# Patient Record
Sex: MALE | Race: WHITE | HISPANIC OR LATINO
[De-identification: names, ages, dates, MRNs, and addresses within clinical notes are randomized per-mention and may not be internally consistent; named-entity substitution may affect disease eponyms.]

---

## 2017-01-03 ENCOUNTER — APPOINTMENT (OUTPATIENT)
Dept: ORTHOPEDIC SURGERY | Facility: CLINIC | Age: 52
End: 2017-01-03

## 2017-01-03 DIAGNOSIS — M65.332 TRIGGER FINGER, LEFT MIDDLE FINGER: ICD-10-CM

## 2017-10-02 ENCOUNTER — APPOINTMENT (OUTPATIENT)
Dept: ORTHOPEDIC SURGERY | Facility: CLINIC | Age: 52
End: 2017-10-02
Payer: MEDICAID

## 2017-10-02 DIAGNOSIS — M72.2 PLANTAR FASCIAL FIBROMATOSIS: ICD-10-CM

## 2017-10-02 PROCEDURE — 99214 OFFICE O/P EST MOD 30 MIN: CPT | Mod: 25

## 2017-10-02 PROCEDURE — 20551 NJX 1 TENDON ORIGIN/INSJ: CPT | Mod: RT

## 2017-10-09 ENCOUNTER — APPOINTMENT (OUTPATIENT)
Dept: ORTHOPEDIC SURGERY | Facility: CLINIC | Age: 52
End: 2017-10-09

## 2017-12-18 ENCOUNTER — APPOINTMENT (OUTPATIENT)
Dept: ORTHOPEDIC SURGERY | Facility: CLINIC | Age: 52
End: 2017-12-18
Payer: MEDICAID

## 2017-12-18 DIAGNOSIS — M17.12 UNILATERAL PRIMARY OSTEOARTHRITIS, LEFT KNEE: ICD-10-CM

## 2017-12-18 PROCEDURE — 99214 OFFICE O/P EST MOD 30 MIN: CPT | Mod: 25

## 2017-12-18 PROCEDURE — 20610 DRAIN/INJ JOINT/BURSA W/O US: CPT | Mod: LT

## 2017-12-18 PROCEDURE — 73562 X-RAY EXAM OF KNEE 3: CPT | Mod: LT

## 2017-12-18 PROCEDURE — 73610 X-RAY EXAM OF ANKLE: CPT | Mod: LT

## 2018-02-26 ENCOUNTER — APPOINTMENT (OUTPATIENT)
Dept: ORTHOPEDIC SURGERY | Facility: CLINIC | Age: 53
End: 2018-02-26
Payer: MEDICAID

## 2018-02-26 DIAGNOSIS — M77.11 LATERAL EPICONDYLITIS, RIGHT ELBOW: ICD-10-CM

## 2018-02-26 PROCEDURE — 99213 OFFICE O/P EST LOW 20 MIN: CPT | Mod: 25

## 2018-02-26 PROCEDURE — 20551 NJX 1 TENDON ORIGIN/INSJ: CPT | Mod: RT

## 2019-09-04 ENCOUNTER — APPOINTMENT (OUTPATIENT)
Dept: ORTHOPEDIC SURGERY | Facility: CLINIC | Age: 54
End: 2019-09-04
Payer: MEDICAID

## 2019-09-04 VITALS
SYSTOLIC BLOOD PRESSURE: 165 MMHG | BODY MASS INDEX: 43.4 KG/M2 | HEIGHT: 71 IN | HEART RATE: 69 BPM | WEIGHT: 310 LBS | DIASTOLIC BLOOD PRESSURE: 92 MMHG

## 2019-09-04 DIAGNOSIS — M65.331 TRIGGER FINGER, RIGHT MIDDLE FINGER: ICD-10-CM

## 2019-09-04 PROCEDURE — 20550 NJX 1 TENDON SHEATH/LIGAMENT: CPT | Mod: F7

## 2019-09-04 PROCEDURE — 99213 OFFICE O/P EST LOW 20 MIN: CPT | Mod: 25

## 2019-09-04 NOTE — PHYSICAL EXAM
[de-identified] : Constitutional: Well-nourished, well-developed, No acute distress\par Respiratory:  Good respiratory effort, no SOB\par Lymphatic: No regional lymphadenopathy, no lymphedema\par Psychiatric: Pleasant and normal affect, alert and oriented x3\par Skin: Clean dry and intact B/L UE/LE\par Musculoskeletal: normal except where as noted in regional exam\par \par Right Hand:\par APPEARANCE: no marked deformities, no swelling or malalignment\par POSITIVE TENDERNESS: mild along flexor tendon and sheath superficial to the MCP at the third  digit\par NONTENDER: osseous and ligamentous structures. \par ROM: full & painless although + triggering of the involved finger going from full flexion into extension. \par RESISTIVE TESTING: painless resisted testing. \par SPECIAL TESTS: normal sensation of 1st through 5th digits, normal flex/ext, abd/add, and opposition of thumb, no triggering of other fingers\par Vasc: 2+ radial pulse, normal capillary refill\par Neuro: AIN, PIN, Ulnar nerve intact to motor\par Sensation: Intact to light touch throughout

## 2019-09-04 NOTE — PROCEDURE
[de-identified] : Injection: Right 3rd digit.\par Indication: Trigger finger.\par \par A discussion was had with the patient regarding this procedure and all questions were answered. All risks, benefits and alternatives were discussed. These included but were not limited to bleeding, infection, and allergic reaction. A timeout was performed prior to the procedure to ensure proper side and location.  Alcohol was used to clean and sterilize the skin over the volar aspect of the MCP. A 25-gauge needle was used to inject 0.5cc of 0.5% marcaine and 1cc of 6mg/mL betamethasone into the flexor tendon sheath. A sterile bandage was then applied. The patient tolerated the procedure well and there were no complications.

## 2019-09-04 NOTE — DISCUSSION/SUMMARY
[de-identified] : Discussed findings of today's exam and possible causes of patient's pain.  Educated patient on their most probable diagnosis of right third digit trigger finger.  Reviewed possible courses of treatment, and we collaboratively decided best course of treatment at this time will include cortisone injection today (see procedure note).  Informed the patient that the numbing medicine in today's injection will last for about 4-6 hours. The steroid that was injected will start to work in 1 to 2 days, peak at 1-2 weeks, and may last up to 4-6 weeks. Discussed with the patient the expected course of trigger fingers, and the variable response to cortisone injections. Some are relieved with a single injection, while others require repeat injections.  Based on the patient's symptoms today, no hand therapy will likely be needed.  We will wait and see how patient responds to this one cortisone injection, may consider repeat injection in 6 weeks if issue is improved but not fully resolved. Patient may consider consultation with hand/wrist surgeon to have a discussion about repeat cortisone injections, versus possible surgical intervention.  Patient appreciates and agrees with current plan.\par \par This note was generated using dragon medical dictation software. A reasonable effort has been made for proofreading its contents, but typos may still remain. If there are any questions or points of clarification needed please notify my office.

## 2019-09-04 NOTE — HISTORY OF PRESENT ILLNESS
[de-identified] : Patient is here for right hand third digit pain. He states that his last injection into his left hand provided great relief but his pain in his right hand began about  3 weeks ago without  injury. He would like to proceed with another injection at this time.

## 2022-12-07 ENCOUNTER — APPOINTMENT (OUTPATIENT)
Dept: PSYCHIATRY | Facility: CLINIC | Age: 57
End: 2022-12-07